# Patient Record
Sex: MALE | Race: WHITE | NOT HISPANIC OR LATINO | Employment: OTHER | URBAN - NONMETROPOLITAN AREA
[De-identification: names, ages, dates, MRNs, and addresses within clinical notes are randomized per-mention and may not be internally consistent; named-entity substitution may affect disease eponyms.]

---

## 2017-03-28 ENCOUNTER — TRANSCRIBE ORDERS (OUTPATIENT)
Dept: CARDIOLOGY | Facility: CLINIC | Age: 82
End: 2017-03-28

## 2017-03-28 DIAGNOSIS — I50.31 ACUTE DIASTOLIC HEART FAILURE (HCC): Primary | ICD-10-CM

## 2017-04-11 ENCOUNTER — CONSULT (OUTPATIENT)
Dept: CARDIOLOGY | Facility: CLINIC | Age: 82
End: 2017-04-11

## 2017-04-11 VITALS
BODY MASS INDEX: 28.44 KG/M2 | HEIGHT: 69 IN | HEART RATE: 86 BPM | DIASTOLIC BLOOD PRESSURE: 68 MMHG | SYSTOLIC BLOOD PRESSURE: 100 MMHG | WEIGHT: 192 LBS

## 2017-04-11 DIAGNOSIS — I10 ESSENTIAL HYPERTENSION: Primary | ICD-10-CM

## 2017-04-11 DIAGNOSIS — E78.00 HYPERCHOLESTEREMIA: ICD-10-CM

## 2017-04-11 DIAGNOSIS — I25.6 SILENT MYOCARDIAL ISCHEMIA: ICD-10-CM

## 2017-04-11 DIAGNOSIS — R06.02 SHORTNESS OF BREATH: ICD-10-CM

## 2017-04-11 DIAGNOSIS — R53.82 CHRONIC FATIGUE: ICD-10-CM

## 2017-04-11 DIAGNOSIS — R01.1 MURMUR, CARDIAC: ICD-10-CM

## 2017-04-11 DIAGNOSIS — R94.31 ABNORMAL EKG: ICD-10-CM

## 2017-04-11 PROCEDURE — 93000 ELECTROCARDIOGRAM COMPLETE: CPT | Performed by: INTERNAL MEDICINE

## 2017-04-11 PROCEDURE — 99204 OFFICE O/P NEW MOD 45 MIN: CPT | Performed by: INTERNAL MEDICINE

## 2017-04-11 RX ORDER — POTASSIUM CHLORIDE 750 MG/1
10 TABLET, FILM COATED, EXTENDED RELEASE ORAL 2 TIMES DAILY
COMMUNITY
End: 2017-04-11

## 2017-04-11 RX ORDER — ATORVASTATIN CALCIUM 40 MG/1
40 TABLET, FILM COATED ORAL DAILY
COMMUNITY

## 2017-04-11 RX ORDER — ESCITALOPRAM OXALATE 10 MG/1
10 TABLET ORAL DAILY
COMMUNITY

## 2017-04-11 RX ORDER — FUROSEMIDE 40 MG/1
40 TABLET ORAL 2 TIMES DAILY
COMMUNITY
End: 2017-04-11

## 2017-04-11 RX ORDER — HYDROCHLOROTHIAZIDE 25 MG/1
25 TABLET ORAL DAILY
Qty: 30 TABLET | Refills: 11 | Status: SHIPPED | OUTPATIENT
Start: 2017-04-11

## 2017-04-11 RX ORDER — OMEPRAZOLE 40 MG/1
40 CAPSULE, DELAYED RELEASE ORAL DAILY
COMMUNITY

## 2017-04-11 RX ORDER — CHLORTHALIDONE 25 MG/1
25 TABLET ORAL DAILY
COMMUNITY
End: 2017-04-11

## 2017-04-11 RX ORDER — AMLODIPINE BESYLATE 5 MG/1
5 TABLET ORAL DAILY
COMMUNITY
End: 2017-04-11

## 2017-04-11 NOTE — PROGRESS NOTES
CARDIAC COMPLAINTS  dyspnea      Subjective   Sachin ORVILLE Cantu is a 81 y.o. male came in today with this partner for the initial cardiac evaluation.  I have seen him about 3 years ago for leg edema.  His cardiac workup done at that time did not show any evidence of ischemia other than the apical infarct.  He was taken off the calcium channel blockers and felt better.  He has now developed significant problem with his memory and most of my history was obtained from his partner.  About couple weeks ago, he developed shortness of breath and cough.  He was diagnosed with flu and was started with Tamiflu.  Apparently he came more short winded and had an x-ray chest done which according to the family showed bilateral pleural effusion.  He was put on Lasix and is referred for further evaluation.  He denies having any chest pain, but his memory is very poor.  According to the partner, he gets short winded on minimal exertion.  He also was having a lot of leg edema.  He has not taken any blood pressure medicine or chlorthalidone for the last few days, since he ran out of the medications.  His leg edema apparently got little better.    Past Surgical History:   Procedure Laterality Date   • CARDIOVASCULAR STRESS TEST  05/19/2004    ([facility]) 4 Min, 90% THR. BP- 190/85. Negative.    • CARDIOVASCULAR STRESS TEST  08/11/2011    4 Min, 88% THR. BP- 130/80. Apical Infarct.    • ECHO - CONVERTED  05/19/2004    ([facility]) EF 65%.    • ECHO - CONVERTED  08/11/2011     EF 65%. Apical WMA. rvsp- 40 mmHg.        Current Outpatient Prescriptions   Medication Sig Dispense Refill   • atorvastatin (LIPITOR) 40 MG tablet Take 40 mg by mouth Daily.     • escitalopram (LEXAPRO) 10 MG tablet Take 10 mg by mouth Daily.     • Memantine HCl (NAMENDA PO) Take  by mouth Daily.     • omeprazole (priLOSEC) 40 MG capsule Take 40 mg by mouth Daily.     • hydrochlorothiazide (HYDRODIURIL) 25 MG tablet Take 1 tablet by mouth Daily. 30 tablet 11     No current  "facility-administered medications for this visit.            ALLERGIES:  Review of patient's allergies indicates no known allergies.    Past Medical History:   Diagnosis Date   • Abnormal ankle brachial index 08/25/2011    WNL    • Alzheimer disease    • Diverticulitis    • History of appendectomy    • Hypercholesteremia    • Hypertension        History   Smoking Status   • Current Every Day Smoker   • Types: Pipe   Smokeless Tobacco   • Never Used     Comment: puff not inhale--10yrs now; Quit smoking cig-20yrs ago..          Review of Systems   Constitutional: Positive for chills, fatigue and fever. Negative for activity change.   HENT: Positive for congestion and rhinorrhea.    Eyes: Negative for photophobia and discharge.   Respiratory: Positive for cough and shortness of breath. Negative for chest tightness.    Cardiovascular: Positive for leg swelling. Negative for chest pain.   Gastrointestinal: Negative for abdominal distention.   Endocrine: Negative for cold intolerance.   Genitourinary: Negative for difficulty urinating.   Musculoskeletal: Positive for arthralgias and neck pain.   Skin: Negative for color change.   Allergic/Immunologic: Negative for environmental allergies.   Neurological: Positive for dizziness.   Hematological: Negative for adenopathy.   Psychiatric/Behavioral: Negative for agitation.       Diabetes- No  Thyroid- normal    Objective     /68 (BP Location: Right arm)  Pulse 86  Ht 69\" (175.3 cm)  Wt 192 lb (87.1 kg)  BMI 28.35 kg/m2    Physical Exam   Constitutional: He appears well-developed.   HENT:   Head: Normocephalic.   Eyes: Pupils are equal, round, and reactive to light.   Neck: Normal range of motion.   Cardiovascular: Normal rate.    Murmur heard.  Pulmonary/Chest: Effort normal.   Abdominal: Soft.   Musculoskeletal: Normal range of motion.   Neurological: He is alert.   Skin: Skin is warm.   Psychiatric: He has a normal mood and affect.         ECG 12 Lead  Date/Time: " 4/11/2017 2:18 PM  Performed by: JONO PERERA  Authorized by: JONO PERERA   Previous ECG: no previous ECG available  Rhythm: sinus rhythm  Rate: normal  Conduction: incomplete RBBB and LAFB  QRS axis: left  Q waves: V3 and V4  Clinical impression: abnormal ECG              Assessment/Plan   His heart rate is slightly up and the blood pressure is low.  His EKG showed sinus rhythm, first-degree AV block, incomplete right bundle branch block, left anterior fascicular block.  His clinical examination was unremarkable other than the heart murmur.  I explained to him in the partner that during his last visit here, we discuss about not taking any more calcium channel blocker.  Since his blood pressure is low, I think we can stop it.  I also advised him not to take anymore chlorthalidone or Lasix for now.  His last x-ray chest report showed no evidence of pleural effusion at this time.  I'm going to do an echocardiogram to evaluate his LV function and the valvular structures.  Given his abnormal EKG, we will do a Lexiscan Myoview to rule out any silent ischemia.  Based on the results, further recommendations will be made.  Sachin JACINTO was seen today for establish care, labs, cardiac testing and medication problem.    Diagnoses and all orders for this visit:    Essential hypertension  -     hydrochlorothiazide (HYDRODIURIL) 25 MG tablet; Take 1 tablet by mouth Daily.    Hypercholesteremia    Chronic fatigue    Shortness of breath  -     Stress Test With Myocardial Perfusion One Day; Future  -     Adult Transthoracic Echo Complete; Future  -     hydrochlorothiazide (HYDRODIURIL) 25 MG tablet; Take 1 tablet by mouth Daily.    Murmur, cardiac  -     Adult Transthoracic Echo Complete; Future    Abnormal EKG  -     Stress Test With Myocardial Perfusion One Day; Future    Silent myocardial ischemia  -     Stress Test With Myocardial Perfusion One Day; Future                    Electronically signed by Jono  MD Kaity April 11, 2017 2:10 PM

## 2017-04-18 ENCOUNTER — HOSPITAL ENCOUNTER (OUTPATIENT)
Dept: CARDIOLOGY | Facility: HOSPITAL | Age: 82
End: 2017-04-18
Attending: INTERNAL MEDICINE

## 2017-04-18 ENCOUNTER — HOSPITAL ENCOUNTER (OUTPATIENT)
Dept: CARDIOLOGY | Facility: HOSPITAL | Age: 82
Discharge: HOME | End: 2017-04-18
Attending: INTERNAL MEDICINE

## 2017-04-18 ENCOUNTER — OUTSIDE FACILITY SERVICE (OUTPATIENT)
Dept: CARDIOLOGY | Facility: CLINIC | Age: 82
End: 2017-04-18

## 2017-04-18 DIAGNOSIS — R01.1 MURMUR, CARDIAC: ICD-10-CM

## 2017-04-18 DIAGNOSIS — R06.02 SHORTNESS OF BREATH: ICD-10-CM

## 2017-04-18 DIAGNOSIS — R94.31 ABNORMAL EKG: ICD-10-CM

## 2017-04-18 DIAGNOSIS — I25.6 SILENT MYOCARDIAL ISCHEMIA: ICD-10-CM

## 2017-04-18 PROCEDURE — 93306 TTE W/DOPPLER COMPLETE: CPT | Performed by: INTERNAL MEDICINE

## 2017-04-18 PROCEDURE — 93306 TTE W/DOPPLER COMPLETE: CPT

## 2017-04-20 ENCOUNTER — TELEPHONE (OUTPATIENT)
Dept: CARDIOLOGY | Facility: CLINIC | Age: 82
End: 2017-04-20

## 2017-04-20 NOTE — TELEPHONE ENCOUNTER
Caregiver Mallory called back with question concerning if patient was to stop Lipitor due to Dr Briceno stopping some medication. Explained to Mallory the Lasix, Chlorthalidone, amlodipine, and potassium was the only medication that was stopped at last visit, Mallory verbalized understanding.

## 2017-04-20 NOTE — TELEPHONE ENCOUNTER
Patient left message with concerns of cholesterol medication, called patient back unable to reach, left message for patient to call back.

## 2017-05-02 ENCOUNTER — HOSPITAL ENCOUNTER (OUTPATIENT)
Dept: CARDIOLOGY | Facility: HOSPITAL | Age: 82
Discharge: HOME | End: 2017-05-02
Attending: INTERNAL MEDICINE

## 2017-05-02 ENCOUNTER — OUTSIDE FACILITY SERVICE (OUTPATIENT)
Dept: CARDIOLOGY | Facility: CLINIC | Age: 82
End: 2017-05-02

## 2017-05-02 LAB
MAXIMAL PREDICTED HEART RATE: 141 BPM
STRESS TARGET HR: 120 BPM

## 2017-05-02 PROCEDURE — 93018 CV STRESS TEST I&R ONLY: CPT | Performed by: INTERNAL MEDICINE

## 2017-05-02 PROCEDURE — A9500 TC99M SESTAMIBI: HCPCS | Performed by: INTERNAL MEDICINE

## 2017-05-02 PROCEDURE — 78452 HT MUSCLE IMAGE SPECT MULT: CPT | Performed by: INTERNAL MEDICINE

## 2017-05-02 PROCEDURE — 93017 CV STRESS TEST TRACING ONLY: CPT

## 2017-05-02 PROCEDURE — 0 TECHNETIUM SESTAMIBI: Performed by: INTERNAL MEDICINE

## 2017-05-02 PROCEDURE — 25010000002 REGADENOSON 0.4 MG/5ML SOLUTION: Performed by: INTERNAL MEDICINE

## 2017-05-02 PROCEDURE — 78452 HT MUSCLE IMAGE SPECT MULT: CPT

## 2017-05-02 RX ADMIN — Medication 1 DOSE: at 10:15

## 2017-05-02 RX ADMIN — REGADENOSON 0.4 MG: 0.08 INJECTION, SOLUTION INTRAVENOUS at 10:15

## 2017-05-04 ENCOUNTER — TELEPHONE (OUTPATIENT)
Dept: CARDIOLOGY | Facility: CLINIC | Age: 82
End: 2017-05-04